# Patient Record
Sex: FEMALE | Race: WHITE | Employment: PART TIME | URBAN - METROPOLITAN AREA
[De-identification: names, ages, dates, MRNs, and addresses within clinical notes are randomized per-mention and may not be internally consistent; named-entity substitution may affect disease eponyms.]

---

## 2019-11-27 ENCOUNTER — MEDICAL CORRESPONDENCE (OUTPATIENT)
Dept: HEALTH INFORMATION MANAGEMENT | Facility: CLINIC | Age: 54
End: 2019-11-27

## 2019-11-27 LAB
ALT SERPL-CCNC: 17 U/L (ref 6–29)
AST SERPL-CCNC: 40 U/L (ref 10–35)
CREAT SERPL-MCNC: 0.59 MG/DL (ref 0.5–1.05)
GFR SERPL CREATININE-BSD FRML MDRD: 105 ML/MIN/1.73M2
GLUCOSE SERPL-MCNC: 91 MG/DL (ref 65–99)
POTASSIUM SERPL-SCNC: 3.8 MMOL/L (ref 3.5–5.3)

## 2019-12-26 ENCOUNTER — TRANSFERRED RECORDS (OUTPATIENT)
Dept: HEALTH INFORMATION MANAGEMENT | Facility: CLINIC | Age: 54
End: 2019-12-26

## 2019-12-26 LAB
ALT SERPL-CCNC: 19 U/L (ref 6–29)
AST SERPL-CCNC: 41 U/L (ref 10–35)
CREAT SERPL-MCNC: 0.51 MG/DL (ref 0.5–1.05)
GFR SERPL CREATININE-BSD FRML MDRD: 110 ML/MIN/1.73M2
GLUCOSE SERPL-MCNC: 104 MG/DL (ref 65–99)
POTASSIUM SERPL-SCNC: 3.4 MMOL/L (ref 3.5–5.3)

## 2020-01-07 ENCOUNTER — TRANSCRIBE ORDERS (OUTPATIENT)
Dept: OTHER | Age: 55
End: 2020-01-07

## 2020-01-08 ENCOUNTER — REFERRAL (OUTPATIENT)
Dept: TRANSPLANT | Facility: CLINIC | Age: 55
End: 2020-01-08

## 2020-01-08 DIAGNOSIS — K70.9 ALCOHOLIC LIVER DISEASE (H): Primary | ICD-10-CM

## 2020-01-08 DIAGNOSIS — K72.90: ICD-10-CM

## 2020-01-08 DIAGNOSIS — K70.31 ALCOHOLIC CIRRHOSIS OF LIVER WITH ASCITES (H): ICD-10-CM

## 2020-01-08 DIAGNOSIS — F10.20 ALCOHOL DEPENDENCE (H): ICD-10-CM

## 2020-01-08 DIAGNOSIS — K76.6 PORTAL HYPERTENSION (H): ICD-10-CM

## 2020-01-08 NOTE — LETTER
1/10/2020    Norma Gonzáles  [address]  [facility] [address]      Dear Norma,    Thank you for your interest in the Transplant Center at [facility], [facility]. We look forward to being a part of your care team and assisting you through the transplant process.    As we discussed, your transplant coordinator is Radu Martinez Jr. (Liver).  You may call your coordinator at any time with questions or concerns call [phone].    Please complete the following.    1. Fill out and return the enclosed forms    Authorization for Electronic Communication    Authorization to Discuss Protected Health Information    Authorization for Release of Protected Health Information    2. Sign up for:    Embedded Internet Solutionst, access to your electronic medical record (see enclosed pamphlet)    "[employer]"transplantRobot App Store.CrowdScannerr, a transplant education website    You can use these tools to learn more about your transplant, communicate with your care team, and track your medical details      Sincerely,  Solid Organ Transplant  [facility], [facility]

## 2020-01-08 NOTE — LETTER
LIVER CONSULT   SCHEDULE    Patient:   Norma Gonzáles  MR#:    [id number]  Coordinator:  Shyam          [phone]  :     Elissa             [phone]  Location[facility]    [facility]  Date(s):    February 4, 2020    This is your consult schedule, please follow dates and times.  You will   receive reminder phone calls for other tests, but please follow this schedule  only!  If you have any questions about dates and times, please call us on  number listed above.  Thank you, Transplant Services     *NO FOOD or DRINK AFTER 10:00 PM*  (You may only have small amounts of water)      Day/Date:    Tuesday, February 4, 2020  Time Location Activity   6:15 AM Imaging and Lab testing  (1st floor [facility] ) Liver Ultrasound with dopplers=NO FOOD or DRINK 8 HOURS BEFORE TEST!!!   7:00 AM Imaging and Lab testing  (1st floor [facility] ) Blood tests    8:00 AM Transplant Services   (3rd floor [facility]) Appointment with Dr. jones ,  Hepatologist   8:30 AM Transplant Services  (3rd floor [facility]) Appointment with Adele Dominguez  Registered Dietitian   9:00 AM Transplant Services  (3rd floor [facility]) Appointment with Maria A Arvizu         Day/Date:    Every Thursday *OPTIONAL*  Time Location Activity   12:00p.m. - 1:30p.m. Liver Transplant Support Group  Hospital Station [facility]  Room 7-120 Every Thursday *OPTIONAL*     *IF ON LINE CHECK IN IS NOT DONE, YOU WILL NEED TO CHECK IN 15 MINUTES EARLIER THEN THE FIRST SCHEDULED APPOINTMENT*

## 2020-01-08 NOTE — LETTER
January 10, 2020          MEDICAL RECORDS REQUEST  [facility] liver transplant team is requesting records from Referring Providers Office for patients referred to the Liver Transplant Program             Images Needed to Process Intake of Patient:    CXR Images (most recent)    Chest CT Images (all within last 24 months or most recent)    Abdominal CT Report and Images  (all within last 24 months or most recent)    Abdominal MRI Report and Images  (all within last 24 months or most recent)    Bone Scan Images and Report (No DEXA Scans)    PET Images and Report  Records Needed to Process Intake of Patient:    Cardiac Catheterization Results (most recent on file)    Chest CT Report (all within last 24 months or most recent)    Chest X-Ray Report (all within last 24 months or most recent)    Culture Results (last 2 years on file)    ECHO Results (most recent on file)    Hospital Discharge Summaries (last 2 years on file)    Lab Results (most recent on file)    History and Physical (original on file)    Liver Pathology All Reports     Physicians Notes (last 3 reports on file)    Radiology Reports (not including Chest X-ray, last 2 years on file)    EGD Images and Report     Colonoscopy Report with Pathology    Requested imaging may be electronically sent to the [employer] imaging system via [id number] DICOM connection.   When unable to send imaging electronically, an exported DICOM CD may be sent. Please indicate when images have been sent electronically on a return faxed cover sheet.    Requested pathology slides should be accompanied by the appropriate report from your institution.    When the patient is hand carrying requested records or the requested records are not at your facility, please indicate this information on a return faxed cover sheet.    Please fax all paper records to [phone] within 3-5 business days.    Please send all scans/slides to:   [facility]  Solid Organ  Transplant Office  [address] [facility], [place], [address] [address]    Please call our office at [phone] if you have any questions or concerns.

## 2020-01-10 VITALS — WEIGHT: 187 LBS | HEIGHT: 65 IN | BODY MASS INDEX: 31.16 KG/M2

## 2020-01-10 ASSESSMENT — MIFFLIN-ST. JEOR: SCORE: 1454.11

## 2020-01-10 NOTE — TELEPHONE ENCOUNTER
Patient Call: Voicemail  Date/Time: 1/10/20 12:08pm  Reason for call: Returning call for referral

## 2020-01-10 NOTE — TELEPHONE ENCOUNTER
Patient was asked the following questions during liver intake call.     Referring Provider: Dr. Berkley Martines   Referring Diagnosis: Acute on chronic alcoholic liver disease  PCP: Dr. Berkley Martines     1)Do you know why you have liver disease: Yes             If Alcoholic Cirrhosis is present when was your last drink: 10/2019             Have you ever been through treatment for alcohol: No  2) Presence of Ascites: Yes Paracentesis: No  3) Presence of Hepatic Encephalopathy: No Medications: No  4) History of GI Bleeding: No  5) EGD: No  6) Colonoscopy: No  7) MELD Score: 19  8) Insurance information: Medicaid [place]      Policy calvert: Self       Subscriber/policy/ID number: [id number]      Group Number:     Referral intake process completed.  Patient is aware that after financial approval is received, medical records will be requested.   Patient confirmed for a callback from transplant coordinator on 1/16/2020.  Tentative evaluation date TBD.    Confirmed coordinator will discuss evaluation process in more detail at the time of their call.   Patient is aware of the need to arrange age appropriate cancer screening, vaccinations, and dental care.  Reminded patient to complete questionnaire, complete medical records release, and review packet prior to evaluation visit .  Assessed patient for special needs (ie--wheelchair, assistance, guardian, and ):  None  Patient instructed to call [phone] with questions.     JAIME Bueno, LPN   Solid Organ Transplant

## 2020-01-10 NOTE — TELEPHONE ENCOUNTER
Patient left a message on the voicemail returning Juju's call.  She can be reached today at [phone]

## 2020-01-16 NOTE — TELEPHONE ENCOUNTER
Patient being referred by her PCP (see CE) Dr. Berkley Martines in [place], [place] for alcoholic cirrhosis. Patient was admitted for hematoma on 10/30/19 and dx with liver cirrhosis during admit. Her MELD during that say was 29. See CE for admission.     She recovered, has been sober since, and MELD has dropped to 15. She still battles some fluid, more edema related.     Plan: consult only on 2/4/20 at 8 am with Dr. Lucia. Consult over due to < 6 mo sobriety and improvement of MELD score. Labs before with SW & nutrition if possible the same trip.      Ascites - on lasix  Taps - none  HE - no  GIB - no  EGD - never  Colon - never    Head - none  Heart - none  Lungs - smoker (1/4-1/2 ppd)  Kidneys - kidney sx as youth (11 y/o) done at  - possible bladder   Cancer - none

## 2020-02-03 ENCOUNTER — TELEPHONE (OUTPATIENT)
Dept: GASTROENTEROLOGY | Facility: CLINIC | Age: 55
End: 2020-02-03

## 2020-02-04 ENCOUNTER — OFFICE VISIT (OUTPATIENT)
Dept: GASTROENTEROLOGY | Facility: CLINIC | Age: 55
End: 2020-02-04
Attending: INTERNAL MEDICINE
Payer: MEDICAID

## 2020-02-04 ENCOUNTER — ANCILLARY PROCEDURE (OUTPATIENT)
Dept: ULTRASOUND IMAGING | Facility: CLINIC | Age: 55
End: 2020-02-04
Attending: INTERNAL MEDICINE
Payer: MEDICAID

## 2020-02-04 ENCOUNTER — COMMITTEE REVIEW (OUTPATIENT)
Dept: TRANSPLANT | Facility: CLINIC | Age: 55
End: 2020-02-04

## 2020-02-04 ENCOUNTER — ALLIED HEALTH/NURSE VISIT (OUTPATIENT)
Dept: TRANSPLANT | Facility: CLINIC | Age: 55
End: 2020-02-04
Attending: INTERNAL MEDICINE
Payer: MEDICAID

## 2020-02-04 VITALS
TEMPERATURE: 97.8 F | HEART RATE: 76 BPM | OXYGEN SATURATION: 99 % | SYSTOLIC BLOOD PRESSURE: 158 MMHG | DIASTOLIC BLOOD PRESSURE: 75 MMHG

## 2020-02-04 DIAGNOSIS — F10.20 ALCOHOL DEPENDENCE (H): ICD-10-CM

## 2020-02-04 DIAGNOSIS — K70.31 ALCOHOLIC CIRRHOSIS OF LIVER WITH ASCITES (H): ICD-10-CM

## 2020-02-04 DIAGNOSIS — K70.9 ALCOHOLIC LIVER DISEASE (H): ICD-10-CM

## 2020-02-04 DIAGNOSIS — K76.6 PORTAL HYPERTENSION (H): ICD-10-CM

## 2020-02-04 DIAGNOSIS — K72.90: ICD-10-CM

## 2020-02-04 DIAGNOSIS — Z76.82 AWAITING ORGAN TRANSPLANT STATUS: Primary | ICD-10-CM

## 2020-02-04 DIAGNOSIS — F10.21 ALCOHOL DEPENDENCE IN REMISSION (H): ICD-10-CM

## 2020-02-04 DIAGNOSIS — K70.30 ALCOHOLIC CIRRHOSIS OF LIVER WITHOUT ASCITES (H): Primary | ICD-10-CM

## 2020-02-04 DIAGNOSIS — K70.9 ALCOHOLIC LIVER DISEASE (H): Primary | ICD-10-CM

## 2020-02-04 LAB
AFP SERPL-MCNC: 5.5 UG/L (ref 0–8)
ALBUMIN SERPL-MCNC: 3.2 G/DL (ref 3.4–5)
ALP SERPL-CCNC: 210 U/L (ref 40–150)
ALT SERPL W P-5'-P-CCNC: 30 U/L (ref 0–50)
ANION GAP SERPL CALCULATED.3IONS-SCNC: 6 MMOL/L (ref 3–14)
AST SERPL W P-5'-P-CCNC: 50 U/L (ref 0–45)
BILIRUB DIRECT SERPL-MCNC: 1.5 MG/DL (ref 0–0.2)
BILIRUB SERPL-MCNC: 2.8 MG/DL (ref 0.2–1.3)
BUN SERPL-MCNC: 17 MG/DL (ref 7–30)
CALCIUM SERPL-MCNC: 9.1 MG/DL (ref 8.5–10.1)
CHLORIDE SERPL-SCNC: 107 MMOL/L (ref 94–109)
CHOLEST SERPL-MCNC: 145 MG/DL
CO2 SERPL-SCNC: 28 MMOL/L (ref 20–32)
CREAT SERPL-MCNC: 0.59 MG/DL (ref 0.52–1.04)
DEPRECATED CALCIDIOL+CALCIFEROL SERPL-MC: 17 UG/L (ref 20–75)
GFR SERPL CREATININE-BSD FRML MDRD: >90 ML/MIN/{1.73_M2}
GLUCOSE SERPL-MCNC: 93 MG/DL (ref 70–99)
HBV CORE AB SERPL QL IA: NONREACTIVE
HBV SURFACE AB SERPL IA-ACNC: 0.32 M[IU]/ML
HBV SURFACE AG SERPL QL IA: NONREACTIVE
HCV AB SERPL QL IA: REACTIVE
HDLC SERPL-MCNC: 57 MG/DL
HIV 1+2 AB+HIV1 P24 AG SERPL QL IA: NONREACTIVE
INR PPP: 1.72 (ref 0.86–1.14)
IRON SATN MFR SERPL: 72 % (ref 15–46)
IRON SERPL-MCNC: 222 UG/DL (ref 35–180)
LDLC SERPL CALC-MCNC: 70 MG/DL
NONHDLC SERPL-MCNC: 88 MG/DL
POTASSIUM SERPL-SCNC: 3.5 MMOL/L (ref 3.4–5.3)
PROT SERPL-MCNC: 8.2 G/DL (ref 6.8–8.8)
SODIUM SERPL-SCNC: 140 MMOL/L (ref 133–144)
T PALLIDUM AB SER QL: NONREACTIVE
TIBC SERPL-MCNC: 307 UG/DL (ref 240–430)
TRANSFERRIN SERPL-MCNC: 249 MG/DL (ref 210–360)
TRIGL SERPL-MCNC: 89 MG/DL

## 2020-02-04 PROCEDURE — 36415 COLL VENOUS BLD VENIPUNCTURE: CPT | Performed by: INTERNAL MEDICINE

## 2020-02-04 PROCEDURE — 25000128 H RX IP 250 OP 636: Mod: ZF | Performed by: INTERNAL MEDICINE

## 2020-02-04 PROCEDURE — 85610 PROTHROMBIN TIME: CPT | Performed by: INTERNAL MEDICINE

## 2020-02-04 PROCEDURE — G0463 HOSPITAL OUTPT CLINIC VISIT: HCPCS | Mod: 25,ZF

## 2020-02-04 PROCEDURE — 83540 ASSAY OF IRON: CPT | Performed by: INTERNAL MEDICINE

## 2020-02-04 PROCEDURE — 86706 HEP B SURFACE ANTIBODY: CPT | Performed by: INTERNAL MEDICINE

## 2020-02-04 PROCEDURE — 80061 LIPID PANEL: CPT | Performed by: INTERNAL MEDICINE

## 2020-02-04 PROCEDURE — 82105 ALPHA-FETOPROTEIN SERUM: CPT | Performed by: INTERNAL MEDICINE

## 2020-02-04 PROCEDURE — 86780 TREPONEMA PALLIDUM: CPT | Performed by: INTERNAL MEDICINE

## 2020-02-04 PROCEDURE — 87340 HEPATITIS B SURFACE AG IA: CPT | Performed by: INTERNAL MEDICINE

## 2020-02-04 PROCEDURE — G0009 ADMIN PNEUMOCOCCAL VACCINE: HCPCS | Mod: ZF

## 2020-02-04 PROCEDURE — 83550 IRON BINDING TEST: CPT | Performed by: INTERNAL MEDICINE

## 2020-02-04 PROCEDURE — 86803 HEPATITIS C AB TEST: CPT | Performed by: INTERNAL MEDICINE

## 2020-02-04 PROCEDURE — 80076 HEPATIC FUNCTION PANEL: CPT | Performed by: INTERNAL MEDICINE

## 2020-02-04 PROCEDURE — 84466 ASSAY OF TRANSFERRIN: CPT | Performed by: INTERNAL MEDICINE

## 2020-02-04 PROCEDURE — 82306 VITAMIN D 25 HYDROXY: CPT | Performed by: INTERNAL MEDICINE

## 2020-02-04 PROCEDURE — 40000866 ZZHCL STATISTIC HIV 1/2 ANTIGEN/ANTIBODY PRETRANSPLANT ONLY: Performed by: INTERNAL MEDICINE

## 2020-02-04 PROCEDURE — 90732 PPSV23 VACC 2 YRS+ SUBQ/IM: CPT | Mod: ZF | Performed by: INTERNAL MEDICINE

## 2020-02-04 PROCEDURE — 97802 MEDICAL NUTRITION INDIV IN: CPT | Mod: ZF

## 2020-02-04 PROCEDURE — 80048 BASIC METABOLIC PNL TOTAL CA: CPT | Performed by: INTERNAL MEDICINE

## 2020-02-04 PROCEDURE — 86704 HEP B CORE ANTIBODY TOTAL: CPT | Performed by: INTERNAL MEDICINE

## 2020-02-04 RX ORDER — PNV,CALCIUM 72/IRON/FOLIC ACID 27 MG-1 MG
TABLET ORAL
COMMUNITY
Start: 2020-01-29

## 2020-02-04 RX ORDER — HYDROXYZINE HYDROCHLORIDE 25 MG/1
TABLET, FILM COATED ORAL
COMMUNITY
Start: 2020-01-29

## 2020-02-04 RX ORDER — LOSARTAN POTASSIUM 25 MG/1
50 TABLET ORAL
COMMUNITY
Start: 2020-01-29

## 2020-02-04 RX ORDER — LIDOCAINE 50 MG/G
1 PATCH TOPICAL
COMMUNITY
Start: 2019-12-05

## 2020-02-04 RX ORDER — GABAPENTIN 300 MG/1
CAPSULE ORAL
COMMUNITY
Start: 2019-12-26

## 2020-02-04 RX ORDER — LANOLIN ALCOHOL/MO/W.PET/CERES
400 CREAM (GRAM) TOPICAL
COMMUNITY
Start: 2020-01-29

## 2020-02-04 RX ORDER — POTASSIUM CHLORIDE 1.5 G/1.58G
POWDER, FOR SOLUTION ORAL
COMMUNITY
Start: 2020-01-08 | End: 2020-08-06 | Stop reason: ALTCHOICE

## 2020-02-04 RX ADMIN — PNEUMOCOCCAL VACCINE POLYVALENT 0.5 ML
25; 25; 25; 25; 25; 25; 25; 25; 25; 25; 25; 25; 25; 25; 25; 25; 25; 25; 25; 25; 25; 25; 25 INJECTION, SOLUTION INTRAMUSCULAR; SUBCUTANEOUS at 10:38

## 2020-02-04 ASSESSMENT — PAIN SCALES - GENERAL: PAINLEVEL: NO PAIN (0)

## 2020-02-04 NOTE — LETTER
February 6, 2020    Norma ROBLERO Nivia  [address]  [facility] [address]    Dear Ms. Gonzáles,   The purpose of this letter is to let you know that on 2/4/2020 the [facility] Multi-Disciplinary Selection Team reviewed the results of your transplant evaluation.  Based on the results of your evaluation and the selection criteria used by our program , the decision was made to not list you on the liver transplant list.  This is because your liver function has improved dramatically and you do not need a liver transplant at this time.   Important things you should know:    Please call your coordinator, Shyam Martinez RN, if you have any questions    Please continue to follow with Dr. Lucia who will re-refer you for evaluation if you liver function worsens or you become decompensated (start having liver related issues). Next appt is 8/6/2020    We recommend that you continue to follow up with your primary care doctor in order to manage your other health concerns.  Enclosed is a letter from [employer] which describes the services offered to patients by [facility] and the Organ Procurement and Transplantation Network.  Thank you for allowing us to participate in your care.  We wish you well.  Sincerely,    Radu Martinez Jr., BSN, RN  Liver Transplant Coordinator  [phone]    Solid Organ Transplant  [facility], [facility]    Enclosures: UNOS Letter  cc: Care Team            The Organ Procurement and Transplantation Network Toll-free patient services line: Your resource for organ transplant information    If you have a question regarding your own medical care, you always should call your transplant hospital first. However, for general organ transplant-related information, you can call the Organ Procurement and Transplantation Network (OPTN) toll-free patient services line at [phone]- [phone]. Anyone, including potential transplant candidates, candidates, recipients,  family members, friends, living donors, and donor family members, can call this number to:        Talk about organ donation, living donation, the transplant process, the donation process, and transplant policies.    Get a free patient information kit with helpful booklets, waiting list and transplant information, and a list of all transplant hospitals.    Ask questions about the OPTN website (https://optn.transplant.hrsa.gov/), the [employer] Network for Organ Sharing s (UNOS) website (https://unos.org/), or the UNOS website for living donors and transplant recipients. (https://www.transplantliving.org/).    Learn how the OPTN can help you.    Talk about any concerns that you may have with a transplant hospital.    The [facility] s transplant system, the OPTN, is managed under federal contract by the [employer] for Organ Sharing (UNOS), which is a non-profit charitable organization. The OPTN helps create and define organ sharing policies that make the best use of donated organs. This process continuously evaluating new advances and discoveries so policies can be adapted to best serve patients waiting for transplants. To do so, the OPTN works closely with transplant professionals, transplant patients, transplant candidates, donor families, living donors, and the public. All transplant programs and organ procurement organizations throughout the country are OPTN members and are obligated to follow the policies the OPTN creates for allocating organs.    The OPTN also is responsible for:      Providing educational material for patients, the public, and professionals.    Raising awareness of the need for donated organs and tissue.    Coordinating organ procurement, matching, and placement.    Collecting information about every organ transplant and donation that occurs in the [place].    Remember, you should contact your transplant hospital directly if you have questions or concerns about your own medical care including  medical records, work-up progress, and test results.    We are not your transplant hospital, and our staff will not be able to answer questions about your case, so please keep your transplant hospital s phone number handy.    However, while you research your transplant needs and learn as much as you can about transplantation and donation, we welcome your call to our toll-free patient services line at [phone].    Updated 4/1/2019

## 2020-02-04 NOTE — PROGRESS NOTES
Psychosocial Assessment-Liver Transplant Consultation  Norma ROBLERO Chinedus was seen in the Transplant Center as part of her consultation for liver transplant.  She will not be referred for a liver transplant evaluation, therefore her social work appointment was cancelled.  Please re-consult as needed.    Maria A Arvizu, LAUREENSW

## 2020-02-04 NOTE — PROGRESS NOTES
HISTORY OF PRESENT ILLNESS:  I had the pleasure of seeing Norma Gonzáles for consultation in the Liver Transplant Clinic at the [facility] on 02/04/2020.  Ms. Gonzáles presents for consultation regarding alcoholic cirrhosis.      She was first discovered to have alcoholic liver disease when she presented with a very large left hip hematoma.  She was hospitalized at [facility] in [place] and it was discovered that she had cirrhosis based on imaging.  She had been drinking up until the time of admission, drinking exclusively beer.  She does work as a .  She has stopped all alcohol since then, and the bilirubin which peaked in the 15s now has decreased dramatically.  She also did gain a great deal of weight during that hospitalization.  She did develop pneumonia as well as a C diff infection and was hospitalized there for about 3 weeks.  She believes she gained about 40 pounds in weight which she has largely now lost.      At present, she denies any right upper quadrant pain, itching or skin rash.  She does still have some fatigue.  She denies any increased abdominal girth and lower extremity edema has almost entirely resolved.  She has not had any gastrointestinal bleeding or any overt signs of hepatic encephalopathy.  She denies any fevers or chills, cough or shortness of breath.  She denies any nausea or vomiting, diarrhea or constipation.  Her appetite has been good.  She is interested in losing some weight.      She reports that when she stopped alcohol she did not develop any alcohol withdrawal and she has not had any other complications of alcoholism.      PAST MEDICAL HISTORY:  Fairly unremarkable.  Her only previous surgery was as a young child.  She had some sort of surgery on her urinary system.  Her kidney function has been normal since then so it is not altogether sure what that represented.  Her kidneys also appear normal.  She has no other medical problems.      SOCIAL  HISTORY:  Her alcohol is as noted above.  She is smoking about a half a pack per day and has been doing so for 35 years.  She has no risk factors for hepatitis C.  She is single.  She has no children.      FAMILY HISTORY:  Positive for liver disease that is not well clarified in her brother who is seeing Jayme Marmolejo at [facility].        REVIEW OF SYSTEMS:  A complete review of systems was negative other than that noted above.     Current Outpatient Medications   Medication     gabapentin (NEURONTIN) 300 MG capsule     hydrOXYzine (ATARAX) 25 MG tablet     lidocaine (LIDODERM) 5 % patch     losartan (COZAAR) 25 MG tablet     magnesium oxide (MAG-OX) 400 (240 Mg) MG tablet     potassium chloride (KLOR-CON) 20 MEQ packet     Prenatal Vit-Fe Fumarate-FA (PREPLUS) 27-1 MG TABS     No current facility-administered medications for this visit.      BP (!) 158/75 (BP Location: Right arm, Patient Position: Sitting, Cuff Size: Adult Regular)   Pulse 76   Temp 97.8  F (36.6  C)   SpO2 99%     PHYSICAL EXAMINATION:  In general, she actually looks relatively well.  HEENT exam shows no scleral icterus or temporal muscle wasting.  Her neck is without thyromegaly.  Chest is clear.  Cardiac exam reveals no S3, S4 or murmur.  Abdominal exam shows no increase in girth.  No masses or tenderness to palpation are present.  Her liver is 10 cm in span without left lobe enlargement.  No spleen tip is palpable, and extremity exam shows no edema.  Skin exam shows no stigmata of chronic liver disease.  Neurologic exam shows no asterixis.     Recent Results (from the past 168 hour(s))   Lipid Profile    Collection Time: 02/04/20  5:49 AM   Result Value Ref Range    Cholesterol 145 <200 mg/dL    Triglycerides 89 <150 mg/dL    HDL Cholesterol 57 >49 mg/dL    LDL Cholesterol Calculated 70 <100 mg/dL    Non HDL Cholesterol 88 <130 mg/dL   Transferrin    Collection Time: 02/04/20  5:49 AM   Result Value Ref Range    Transferrin 249 210 - 360 mg/dL    Iron and iron binding capacity    Collection Time: 02/04/20  5:49 AM   Result Value Ref Range    Iron 222 (H) 35 - 180 ug/dL    Iron Binding Cap 307 240 - 430 ug/dL    Iron Saturation Index 72 (H) 15 - 46 %   INR    Collection Time: 02/04/20  5:49 AM   Result Value Ref Range    INR 1.72 (H) 0.86 - 1.14   AFP tumor marker    Collection Time: 02/04/20  5:49 AM   Result Value Ref Range    Alpha Fetoprotein 5.5 0 - 8 ug/L   Hepatic panel    Collection Time: 02/04/20  5:49 AM   Result Value Ref Range    Bilirubin Direct 1.5 (H) 0.0 - 0.2 mg/dL    Bilirubin Total 2.8 (H) 0.2 - 1.3 mg/dL    Albumin 3.2 (L) 3.4 - 5.0 g/dL    Protein Total 8.2 6.8 - 8.8 g/dL    Alkaline Phosphatase 210 (H) 40 - 150 U/L    ALT 30 0 - 50 U/L    AST 50 (H) 0 - 45 U/L   Basic metabolic panel    Collection Time: 02/04/20  5:49 AM   Result Value Ref Range    Sodium 140 133 - 144 mmol/L    Potassium 3.5 3.4 - 5.3 mmol/L    Chloride 107 94 - 109 mmol/L    Carbon Dioxide 28 20 - 32 mmol/L    Anion Gap 6 3 - 14 mmol/L    Glucose 93 70 - 99 mg/dL    Urea Nitrogen 17 7 - 30 mg/dL    Creatinine 0.59 0.52 - 1.04 mg/dL    GFR Estimate >90 >60 mL/min/[1.73_m2]    GFR Estimate If Black >90 >60 mL/min/[1.73_m2]    Calcium 9.1 8.5 - 10.1 mg/dL      IMAGING:  She did have an ultrasound in clinic that shows a cirrhotic-appearing liver.  No focal masses are seen.  There is no ascites or pleural effusion.      IMPRESSION:  Ms. Gonzáles has alcoholic cirrhosis.  It seems as though she was really quite ill back in November but now her liver has improved dramatically.  Her current MELD score is only 12.  Thus, I do not think she is a candidate at this point in time for transplantation based on improved condition of her liver.  I strongly emphasized that she needs to completely abstain from alcohol for the near future.      In terms of screening and vaccinations, she will get Pneumovax today and we will give her Prevnar 13 in 1 year.  I recommend she get a flu shot as  well.  She does need an upper GI endoscopy to screen for esophageal varices.  She also should have a bone density to screen for osteoporosis.  All that can be done at her local clinic or in [place].  My plan will be to see her back in the clinic in 6 months.      Thank you very much for allowing me to participate in the care of this patient.  If you have any questions regarding my recommendations, please do not hesitate to contact me.       Magdy Lucia MD      Professor of Medicine  [employer] [employer]      Executive Medical Director, Solid Organ Transplant Program  [facility]

## 2020-02-04 NOTE — LETTER
2/4/2020      RE: Norma Gonzáles  [address]  \[facility] [place] [facility]\"" [id number]       HISTORY OF PRESENT ILLNESS:  I had the pleasure of seeing Norma Gonzáles for consultation in the Liver Transplant Clinic at the [facility] on 02/04/2020.  Ms. Gonzáles presents for consultation regarding alcoholic cirrhosis.      She was first discovered to have alcoholic liver disease when she presented with a very large left hip hematoma.  She was hospitalized at [facility] in [place] and it was discovered that she had cirrhosis based on imaging.  She had been drinking up until the time of admission, drinking exclusively beer.  She does work as a .  She has stopped all alcohol since then, and the bilirubin which peaked in the 15s now has decreased dramatically.  She also did gain a great deal of weight during that hospitalization.  She did develop pneumonia as well as a C diff infection and was hospitalized there for about 3 weeks.  She believes she gained about 40 pounds in weight which she has largely now lost.      At present, she denies any right upper quadrant pain, itching or skin rash.  She does still have some fatigue.  She denies any increased abdominal girth and lower extremity edema has almost entirely resolved.  She has not had any gastrointestinal bleeding or any overt signs of hepatic encephalopathy.  She denies any fevers or chills, cough or shortness of breath.  She denies any nausea or vomiting, diarrhea or constipation.  Her appetite has been good.  She is interested in losing some weight.      She reports that when she stopped alcohol she did not develop any alcohol withdrawal and she has not had any other complications of alcoholism.      PAST MEDICAL HISTORY:  Fairly unremarkable.  Her only previous surgery was as a young child.  She had some sort of surgery on her urinary system.  Her kidney function has been normal since then so it is not altogether sure what that represented.  Her kidneys also  appear normal.  She has no other medical problems.      SOCIAL HISTORY:  Her alcohol is as noted above.  She is smoking about a half a pack per day and has been doing so for 35 years.  She has no risk factors for hepatitis C.  She is single.  She has no children.      FAMILY HISTORY:  Positive for liver disease that is not well clarified in her brother who is seeing Jayme Marmolejo at [facility].        REVIEW OF SYSTEMS:  A complete review of systems was negative other than that noted above.     Current Outpatient Medications   Medication     gabapentin (NEURONTIN) 300 MG capsule     hydrOXYzine (ATARAX) 25 MG tablet     lidocaine (LIDODERM) 5 % patch     losartan (COZAAR) 25 MG tablet     magnesium oxide (MAG-OX) 400 (240 Mg) MG tablet     potassium chloride (KLOR-CON) 20 MEQ packet     Prenatal Vit-Fe Fumarate-FA (PREPLUS) 27-1 MG TABS     No current facility-administered medications for this visit.      BP (!) 158/75 (BP Location: Right arm, Patient Position: Sitting, Cuff Size: Adult Regular)   Pulse 76   Temp 97.8  F (36.6  C)   SpO2 99%     PHYSICAL EXAMINATION:  In general, she actually looks relatively well.  HEENT exam shows no scleral icterus or temporal muscle wasting.  Her neck is without thyromegaly.  Chest is clear.  Cardiac exam reveals no S3, S4 or murmur.  Abdominal exam shows no increase in girth.  No masses or tenderness to palpation are present.  Her liver is 10 cm in span without left lobe enlargement.  No spleen tip is palpable, and extremity exam shows no edema.  Skin exam shows no stigmata of chronic liver disease.  Neurologic exam shows no asterixis.     Recent Results (from the past 168 hour(s))   Lipid Profile    Collection Time: 02/04/20  5:49 AM   Result Value Ref Range    Cholesterol 145 <200 mg/dL    Triglycerides 89 <150 mg/dL    HDL Cholesterol 57 >49 mg/dL    LDL Cholesterol Calculated 70 <100 mg/dL    Non HDL Cholesterol 88 <130 mg/dL   Transferrin    Collection Time: 02/04/20  5:49  AM   Result Value Ref Range    Transferrin 249 210 - 360 mg/dL   Iron and iron binding capacity    Collection Time: 02/04/20  5:49 AM   Result Value Ref Range    Iron 222 (H) 35 - 180 ug/dL    Iron Binding Cap 307 240 - 430 ug/dL    Iron Saturation Index 72 (H) 15 - 46 %   INR    Collection Time: 02/04/20  5:49 AM   Result Value Ref Range    INR 1.72 (H) 0.86 - 1.14   AFP tumor marker    Collection Time: 02/04/20  5:49 AM   Result Value Ref Range    Alpha Fetoprotein 5.5 0 - 8 ug/L   Hepatic panel    Collection Time: 02/04/20  5:49 AM   Result Value Ref Range    Bilirubin Direct 1.5 (H) 0.0 - 0.2 mg/dL    Bilirubin Total 2.8 (H) 0.2 - 1.3 mg/dL    Albumin 3.2 (L) 3.4 - 5.0 g/dL    Protein Total 8.2 6.8 - 8.8 g/dL    Alkaline Phosphatase 210 (H) 40 - 150 U/L    ALT 30 0 - 50 U/L    AST 50 (H) 0 - 45 U/L   Basic metabolic panel    Collection Time: 02/04/20  5:49 AM   Result Value Ref Range    Sodium 140 133 - 144 mmol/L    Potassium 3.5 3.4 - 5.3 mmol/L    Chloride 107 94 - 109 mmol/L    Carbon Dioxide 28 20 - 32 mmol/L    Anion Gap 6 3 - 14 mmol/L    Glucose 93 70 - 99 mg/dL    Urea Nitrogen 17 7 - 30 mg/dL    Creatinine 0.59 0.52 - 1.04 mg/dL    GFR Estimate >90 >60 mL/min/[1.73_m2]    GFR Estimate If Black >90 >60 mL/min/[1.73_m2]    Calcium 9.1 8.5 - 10.1 mg/dL      IMAGING:  She did have an ultrasound in clinic that shows a cirrhotic-appearing liver.  No focal masses are seen.  There is no ascites or pleural effusion.      IMPRESSION:  Ms. Gonzáles has alcoholic cirrhosis.  It seems as though she was really quite ill back in November but now her liver has improved dramatically.  Her current MELD score is only 12.  Thus, I do not think she is a candidate at this point in time for transplantation based on improved condition of her liver.  I strongly emphasized that she needs to completely abstain from alcohol for the near future.      In terms of screening and vaccinations, she will get Pneumovax today and we will  give her Prevnar 13 in 1 year.  I recommend she get a flu shot as well.  She does need an upper GI endoscopy to screen for esophageal varices.  She also should have a bone density to screen for osteoporosis.  All that can be done at her local clinic or in [place].  My plan will be to see her back in the clinic in 6 months.      Thank you very much for allowing me to participate in the care of this patient.  If you have any questions regarding my recommendations, please do not hesitate to contact me.       Magdy Lucia MD      Professor of Medicine  [employer]      Executive Medical Director, Solid Organ Transplant Program  [facility]     Magdy Lucia MD

## 2020-02-04 NOTE — NURSING NOTE
Chief Complaint   Patient presents with     New Patient     Liver Consult     BP (!) 158/75 (BP Location: Right arm, Patient Position: Sitting, Cuff Size: Adult Regular)   Pulse 76   Temp 97.8  F (36.6  C)   SpO2 99%       Mushtaq Barney, EMT

## 2020-02-04 NOTE — LETTER
2/4/2020       RE: Norma Gonzáles  [address]  [facility] [address]     Dear Colleague,    Thank you for referring your patient, Norma Gonzáles, to the [facility] HEPATOLOGY at [facility]. Please see a copy of my visit note below.    HISTORY OF PRESENT ILLNESS:  I had the pleasure of seeing Norma Gonzáles for consultation in the Liver Transplant Clinic at the [facility] on 02/04/2020.  Ms. Gonzáles presents for consultation regarding alcoholic cirrhosis.      She was first discovered to have alcoholic liver disease when she presented with a very large left hip hematoma.  She was hospitalized at [facility] in [place] and it was discovered that she had cirrhosis based on imaging.  She had been drinking up until the time of admission, drinking exclusively beer.  She does work as a .  She has stopped all alcohol since then, and the bilirubin which peaked in the 15s now has decreased dramatically.  She also did gain a great deal of weight during that hospitalization.  She did develop pneumonia as well as a C diff infection and was hospitalized there for about 3 weeks.  She believes she gained about 40 pounds in weight which she has largely now lost.      At present, she denies any right upper quadrant pain, itching or skin rash.  She does still have some fatigue.  She denies any increased abdominal girth and lower extremity edema has almost entirely resolved.  She has not had any gastrointestinal bleeding or any overt signs of hepatic encephalopathy.  She denies any fevers or chills, cough or shortness of breath.  She denies any nausea or vomiting, diarrhea or constipation.  Her appetite has been good.  She is interested in losing some weight.      She reports that when she stopped alcohol she did not develop any alcohol withdrawal and she has not had any other complications of alcoholism.      PAST MEDICAL HISTORY:  Fairly unremarkable.  Her only previous  surgery was as a young child.  She had some sort of surgery on her urinary system.  Her kidney function has been normal since then so it is not altogether sure what that represented.  Her kidneys also appear normal.  She has no other medical problems.      SOCIAL HISTORY:  Her alcohol is as noted above.  She is smoking about a half a pack per day and has been doing so for 35 years.  She has no risk factors for hepatitis C.  She is single.  She has no children.      FAMILY HISTORY:  Positive for liver disease that is not well clarified in her brother who is seeing Jayme Marmolejo at [facility].        REVIEW OF SYSTEMS:  A complete review of systems was negative other than that noted above.     Current Outpatient Medications   Medication     gabapentin (NEURONTIN) 300 MG capsule     hydrOXYzine (ATARAX) 25 MG tablet     lidocaine (LIDODERM) 5 % patch     losartan (COZAAR) 25 MG tablet     magnesium oxide (MAG-OX) 400 (240 Mg) MG tablet     potassium chloride (KLOR-CON) 20 MEQ packet     Prenatal Vit-Fe Fumarate-FA (PREPLUS) 27-1 MG TABS     No current facility-administered medications for this visit.      BP (!) 158/75 (BP Location: Right arm, Patient Position: Sitting, Cuff Size: Adult Regular)   Pulse 76   Temp 97.8  F (36.6  C)   SpO2 99%     PHYSICAL EXAMINATION:  In general, she actually looks relatively well.  HEENT exam shows no scleral icterus or temporal muscle wasting.  Her neck is without thyromegaly.  Chest is clear.  Cardiac exam reveals no S3, S4 or murmur.  Abdominal exam shows no increase in girth.  No masses or tenderness to palpation are present.  Her liver is 10 cm in span without left lobe enlargement.  No spleen tip is palpable, and extremity exam shows no edema.  Skin exam shows no stigmata of chronic liver disease.  Neurologic exam shows no asterixis.     Recent Results (from the past 168 hour(s))   Lipid Profile    Collection Time: 02/04/20  5:49 AM   Result Value Ref Range    Cholesterol 145  <200 mg/dL    Triglycerides 89 <150 mg/dL    HDL Cholesterol 57 >49 mg/dL    LDL Cholesterol Calculated 70 <100 mg/dL    Non HDL Cholesterol 88 <130 mg/dL   Transferrin    Collection Time: 02/04/20  5:49 AM   Result Value Ref Range    Transferrin 249 210 - 360 mg/dL   Iron and iron binding capacity    Collection Time: 02/04/20  5:49 AM   Result Value Ref Range    Iron 222 (H) 35 - 180 ug/dL    Iron Binding Cap 307 240 - 430 ug/dL    Iron Saturation Index 72 (H) 15 - 46 %   INR    Collection Time: 02/04/20  5:49 AM   Result Value Ref Range    INR 1.72 (H) 0.86 - 1.14   AFP tumor marker    Collection Time: 02/04/20  5:49 AM   Result Value Ref Range    Alpha Fetoprotein 5.5 0 - 8 ug/L   Hepatic panel    Collection Time: 02/04/20  5:49 AM   Result Value Ref Range    Bilirubin Direct 1.5 (H) 0.0 - 0.2 mg/dL    Bilirubin Total 2.8 (H) 0.2 - 1.3 mg/dL    Albumin 3.2 (L) 3.4 - 5.0 g/dL    Protein Total 8.2 6.8 - 8.8 g/dL    Alkaline Phosphatase 210 (H) 40 - 150 U/L    ALT 30 0 - 50 U/L    AST 50 (H) 0 - 45 U/L   Basic metabolic panel    Collection Time: 02/04/20  5:49 AM   Result Value Ref Range    Sodium 140 133 - 144 mmol/L    Potassium 3.5 3.4 - 5.3 mmol/L    Chloride 107 94 - 109 mmol/L    Carbon Dioxide 28 20 - 32 mmol/L    Anion Gap 6 3 - 14 mmol/L    Glucose 93 70 - 99 mg/dL    Urea Nitrogen 17 7 - 30 mg/dL    Creatinine 0.59 0.52 - 1.04 mg/dL    GFR Estimate >90 >60 mL/min/[1.73_m2]    GFR Estimate If Black >90 >60 mL/min/[1.73_m2]    Calcium 9.1 8.5 - 10.1 mg/dL      IMAGING:  She did have an ultrasound in clinic that shows a cirrhotic-appearing liver.  No focal masses are seen.  There is no ascites or pleural effusion.      IMPRESSION:  Ms. Gonzáles has alcoholic cirrhosis.  It seems as though she was really quite ill back in November but now her liver has improved dramatically.  Her current MELD score is only 12.  Thus, I do not think she is a candidate at this point in time for transplantation based on improved  condition of her liver.  I strongly emphasized that she needs to completely abstain from alcohol for the near future.      In terms of screening and vaccinations, she will get Pneumovax today and we will give her Prevnar 13 in 1 year.  I recommend she get a flu shot as well.  She does need an upper GI endoscopy to screen for esophageal varices.  She also should have a bone density to screen for osteoporosis.  All that can be done at her local clinic or in [place].  My plan will be to see her back in the clinic in 6 months.      Thank you very much for allowing me to participate in the care of this patient.  If you have any questions regarding my recommendations, please do not hesitate to contact me.       Magdy Lucia MD      Professor of Medicine  [employer]      Executive Medical Director, Solid Organ Transplant Program  [facility]

## 2020-02-04 NOTE — LETTER
2/4/2020       RE: Norma Gonzáles  [address]  [facility] [address]     Dear Colleague,    Thank you for referring your patient, Norma Gonzáles, to the [facility] HEPATOLOGY at [facility]. Please see a copy of my visit note below.    HISTORY OF PRESENT ILLNESS:  I had the pleasure of seeing Norma Gonzáles for consultation in the Liver Transplant Clinic at the [facility] on 02/04/2020.  Ms. Gonzáles presents for consultation regarding alcoholic cirrhosis.      She was first discovered to have alcoholic liver disease when she presented with a very large left hip hematoma.  She was hospitalized at [facility] in [place] and it was discovered that she had cirrhosis based on imaging.  She had been drinking up until the time of admission, drinking exclusively beer.  She does work as a .  She has stopped all alcohol since then, and the bilirubin which peaked in the 15s now has decreased dramatically.  She also did gain a great deal of weight during that hospitalization.  She did develop pneumonia as well as a C diff infection and was hospitalized there for about 3 weeks.  She believes she gained about 40 pounds in weight which she has largely now lost.      At present, she denies any right upper quadrant pain, itching or skin rash.  She does still have some fatigue.  She denies any increased abdominal girth and lower extremity edema has almost entirely resolved.  She has not had any gastrointestinal bleeding or any overt signs of hepatic encephalopathy.  She denies any fevers or chills, cough or shortness of breath.  She denies any nausea or vomiting, diarrhea or constipation.  Her appetite has been good.  She is interested in losing some weight.      She reports that when she stopped alcohol she did not develop any alcohol withdrawal and she has not had any other complications of alcoholism.      PAST MEDICAL HISTORY:  Fairly unremarkable.  Her only previous  surgery was as a young child.  She had some sort of surgery on her urinary system.  Her kidney function has been normal since then so it is not altogether sure what that represented.  Her kidneys also appear normal.  She has no other medical problems.      SOCIAL HISTORY:  Her alcohol is as noted above.  She is smoking about a half a pack per day and has been doing so for 35 years.  She has no risk factors for hepatitis C.  She is single.  She has no children.      FAMILY HISTORY:  Positive for liver disease that is not well clarified in her brother who is seeing Jayme Marmolejo at [facility].        REVIEW OF SYSTEMS:  A complete review of systems was negative other than that noted above.     Current Outpatient Medications   Medication     gabapentin (NEURONTIN) 300 MG capsule     hydrOXYzine (ATARAX) 25 MG tablet     lidocaine (LIDODERM) 5 % patch     losartan (COZAAR) 25 MG tablet     magnesium oxide (MAG-OX) 400 (240 Mg) MG tablet     potassium chloride (KLOR-CON) 20 MEQ packet     Prenatal Vit-Fe Fumarate-FA (PREPLUS) 27-1 MG TABS     No current facility-administered medications for this visit.      BP (!) 158/75 (BP Location: Right arm, Patient Position: Sitting, Cuff Size: Adult Regular)   Pulse 76   Temp 97.8  F (36.6  C)   SpO2 99%     PHYSICAL EXAMINATION:  In general, she actually looks relatively well.  HEENT exam shows no scleral icterus or temporal muscle wasting.  Her neck is without thyromegaly.  Chest is clear.  Cardiac exam reveals no S3, S4 or murmur.  Abdominal exam shows no increase in girth.  No masses or tenderness to palpation are present.  Her liver is 10 cm in span without left lobe enlargement.  No spleen tip is palpable, and extremity exam shows no edema.  Skin exam shows no stigmata of chronic liver disease.  Neurologic exam shows no asterixis.     Recent Results (from the past 168 hour(s))   Lipid Profile    Collection Time: 02/04/20  5:49 AM   Result Value Ref Range    Cholesterol 145  <200 mg/dL    Triglycerides 89 <150 mg/dL    HDL Cholesterol 57 >49 mg/dL    LDL Cholesterol Calculated 70 <100 mg/dL    Non HDL Cholesterol 88 <130 mg/dL   Transferrin    Collection Time: 02/04/20  5:49 AM   Result Value Ref Range    Transferrin 249 210 - 360 mg/dL   Iron and iron binding capacity    Collection Time: 02/04/20  5:49 AM   Result Value Ref Range    Iron 222 (H) 35 - 180 ug/dL    Iron Binding Cap 307 240 - 430 ug/dL    Iron Saturation Index 72 (H) 15 - 46 %   INR    Collection Time: 02/04/20  5:49 AM   Result Value Ref Range    INR 1.72 (H) 0.86 - 1.14   AFP tumor marker    Collection Time: 02/04/20  5:49 AM   Result Value Ref Range    Alpha Fetoprotein 5.5 0 - 8 ug/L   Hepatic panel    Collection Time: 02/04/20  5:49 AM   Result Value Ref Range    Bilirubin Direct 1.5 (H) 0.0 - 0.2 mg/dL    Bilirubin Total 2.8 (H) 0.2 - 1.3 mg/dL    Albumin 3.2 (L) 3.4 - 5.0 g/dL    Protein Total 8.2 6.8 - 8.8 g/dL    Alkaline Phosphatase 210 (H) 40 - 150 U/L    ALT 30 0 - 50 U/L    AST 50 (H) 0 - 45 U/L   Basic metabolic panel    Collection Time: 02/04/20  5:49 AM   Result Value Ref Range    Sodium 140 133 - 144 mmol/L    Potassium 3.5 3.4 - 5.3 mmol/L    Chloride 107 94 - 109 mmol/L    Carbon Dioxide 28 20 - 32 mmol/L    Anion Gap 6 3 - 14 mmol/L    Glucose 93 70 - 99 mg/dL    Urea Nitrogen 17 7 - 30 mg/dL    Creatinine 0.59 0.52 - 1.04 mg/dL    GFR Estimate >90 >60 mL/min/[1.73_m2]    GFR Estimate If Black >90 >60 mL/min/[1.73_m2]    Calcium 9.1 8.5 - 10.1 mg/dL      IMAGING:  She did have an ultrasound in clinic that shows a cirrhotic-appearing liver.  No focal masses are seen.  There is no ascites or pleural effusion.      IMPRESSION:  Ms. Gonzáles has alcoholic cirrhosis.  It seems as though she was really quite ill back in November but now her liver has improved dramatically.  Her current MELD score is only 12.  Thus, I do not think she is a candidate at this point in time for transplantation based on improved  condition of her liver.  I strongly emphasized that she needs to completely abstain from alcohol for the near future.      In terms of screening and vaccinations, she will get Pneumovax today and we will give her Prevnar 13 in 1 year.  I recommend she get a flu shot as well.  She does need an upper GI endoscopy to screen for esophageal varices.  She also should have a bone density to screen for osteoporosis.  All that can be done at her local clinic or in [place].  My plan will be to see her back in the clinic in 6 months.      Thank you very much for allowing me to participate in the care of this patient.  If you have any questions regarding my recommendations, please do not hesitate to contact me.       Magdy Lucia MD      Professor of Medicine  [employer]      Executive Medical Director, Solid Organ Transplant Program  [facility]

## 2020-02-04 NOTE — COMMITTEE REVIEW
Abdominal Committee Review Note     Evaluation Date: 2/4/2020  Committee Review Date: 2/4/2020    Organ being evaluated for: Liver    Transplant Phase: Evaluation  Transplant Status: Active    Transplant Coordinator: Radu Martinez Jr.  Transplant Surgeon:       Referring Physician: Provider Not In System    Primary Diagnosis:   Secondary Diagnosis:     Committee Review Members:  Nutrition Soo Geronimo, RD   Pharmacy Hardy Navarrete, [facility]    - Clinical Maria A Arvizu, EFRAIN, Jessica Lima, [facility]   Transplant Magdy Lucia MD, Jr Radu Martinez RN, Elissa Lora MD, Leanne Fuchs, APRN CNP, James Dean MD, James Singh MD, Darby Hawkins, HAKAN, Jesus Bazan MD, Segundo Glass MD, Thomas M. Leventhal, MD, Justin Kennedy MD, Alda Pablo MD       Transplant Eligibility: Cirrhosis with MELD, ETOH    Committee Review Decision: Declined    Relative Contraindications: Other, too well - does not need transplant at this time    Absolute Contraindications: None    Committee Chair Elissa Lora MD verbally attested to the committee's decision.    Committee Discussion Details: close referral/evaluation    Doing too well for transplant. Continue to follow and re-refer if decompensation or MELD increases.

## 2020-02-04 NOTE — PROGRESS NOTES
"Outpatient MNT: Liver Consult    Current BMI: 31 kg/m2 (HT 65 in,  lbs/85 kg)     Time Spent: 30 minutes  Visit Type: Initial   Referring Physician: Dr. Lucia  Pt accompanied by: Brother     Medical dx associated with RD referral  Alcoholic liver disease    Nutrition Assessment  Appetite: Pt reports having a good appetite but does not have an eating schedule. 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      Vitamins, Supplements, Pertinent Meds: B12, Prenatal   Herbal Medicines/Supplements: None    Diet Recall  Unscheduled meals throughout the day  Breakfast burrito, spinach salad with berries, hamburgers, cheese, red meat like steak, donuts, ice cream    Beverages Pop (3 cans per week), 5-6 x 16 fl oz bottles of water   Dining out 1-3 meals per month      Physical Activity  No designated exercise time      Anthropometrics  Height:   65 in   BMI:    31 kg/m2    Weight Status:Obesity Grade I BMI 30-34.9   Weight:  187 lbs (85 kg)             IBW (lb): 125#  % IBW: 150%    Wt Hx: The pt reports that when she was drinking last year she \"got down to a size three\" and thinks she has gained 60# over the last 6 months. This wt gained in partially d/t fluid retention.     Adj/dosing BW: 141 lbs/64 kg       Malnutrition  % Intake: No decreased intake noted  % Weight Loss: None noted  Subcutaneous Fat Loss: None noted  Muscle Loss: None noted  Fluid Accumulation/Edema: mild generalized edema   Malnutrition Diagnosis: Patient does not meet two of the above criteria necessary for diagnosing malnutrition    Estimated Nutrition Needs  Energy  7393-2152 kcal/day      (25-30 kcal/kg for maintenance)   Protein  51-64 g/day    (0.8-1 g/kg for maintenance)        Fluid  1 ml/kcal or per MD        Micronutrient   Na+: <2000 mg/day            Nutrition Diagnosis  Excessive Na+ intake r/t food and nutrition related knowledge deficit AEB diet recall reveals " high Na+ foods.    Nutrition Intervention  Nutrition education provided:  Discussed sodium intake (low sodium foods and drinks, seasoning food without salt and tips for low sodium diet).    Discussed the importance of consuming adequate protein. Helped the pt identify several low sodium protein sources that she already enjoys eating and encouraged her to eat at least 3 servings per day. Encouraged receiving protein from both animal and plant based sources.     Discussed strategies for wt loss including: increased physical activity (set exercise goal of walking for 20 minutes, 3 days per week), eating 3 schedules meals to day to prevent starvation after work, building meals around protein, adding veggie sides second and starches last if she is still hungry, etc.     Patient Understanding: Pt verbalized understanding of education provided.  Expected Compliance: Good  Follow-Up Plans: PRN     Nutrition Goals  1. Limit Na+ <2000mg/day  2. Pt to verbalize understanding of 3 aspects of nutrition education provided    Soo Geronimo RD, LD  [facility] [phone]

## 2020-03-22 ENCOUNTER — HEALTH MAINTENANCE LETTER (OUTPATIENT)
Age: 55
End: 2020-03-22

## 2020-06-08 ENCOUNTER — DOCUMENTATION ONLY (OUTPATIENT)
Dept: CARE COORDINATION | Facility: CLINIC | Age: 55
End: 2020-06-08

## 2020-06-09 ENCOUNTER — DOCUMENTATION ONLY (OUTPATIENT)
Dept: CARE COORDINATION | Facility: CLINIC | Age: 55
End: 2020-06-09

## 2020-08-06 ENCOUNTER — OFFICE VISIT (OUTPATIENT)
Dept: GASTROENTEROLOGY | Facility: CLINIC | Age: 55
End: 2020-08-06
Attending: INTERNAL MEDICINE
Payer: COMMERCIAL

## 2020-08-06 ENCOUNTER — ANCILLARY PROCEDURE (OUTPATIENT)
Dept: ULTRASOUND IMAGING | Facility: CLINIC | Age: 55
End: 2020-08-06
Attending: INTERNAL MEDICINE
Payer: MEDICAID

## 2020-08-06 ENCOUNTER — TELEPHONE (OUTPATIENT)
Dept: GASTROENTEROLOGY | Facility: CLINIC | Age: 55
End: 2020-08-06

## 2020-08-06 VITALS
DIASTOLIC BLOOD PRESSURE: 78 MMHG | HEART RATE: 63 BPM | OXYGEN SATURATION: 99 % | TEMPERATURE: 98 F | BODY MASS INDEX: 31.82 KG/M2 | SYSTOLIC BLOOD PRESSURE: 166 MMHG | WEIGHT: 191.2 LBS

## 2020-08-06 DIAGNOSIS — K70.30 ALCOHOLIC CIRRHOSIS OF LIVER WITHOUT ASCITES (H): ICD-10-CM

## 2020-08-06 DIAGNOSIS — K70.30 ALCOHOLIC CIRRHOSIS OF LIVER WITHOUT ASCITES (H): Primary | ICD-10-CM

## 2020-08-06 LAB
AFP SERPL-MCNC: 7.1 UG/L (ref 0–8)
ALBUMIN SERPL-MCNC: 3.2 G/DL (ref 3.4–5)
ALP SERPL-CCNC: 150 U/L (ref 40–150)
ALT SERPL W P-5'-P-CCNC: 31 U/L (ref 0–50)
ANION GAP SERPL CALCULATED.3IONS-SCNC: 7 MMOL/L (ref 3–14)
AST SERPL W P-5'-P-CCNC: 41 U/L (ref 0–45)
BILIRUB DIRECT SERPL-MCNC: 1 MG/DL (ref 0–0.2)
BILIRUB SERPL-MCNC: 2.6 MG/DL (ref 0.2–1.3)
BUN SERPL-MCNC: 10 MG/DL (ref 7–30)
CALCIUM SERPL-MCNC: 8.8 MG/DL (ref 8.5–10.1)
CHLORIDE SERPL-SCNC: 113 MMOL/L (ref 94–109)
CO2 SERPL-SCNC: 23 MMOL/L (ref 20–32)
CREAT SERPL-MCNC: 0.62 MG/DL (ref 0.52–1.04)
ERYTHROCYTE [DISTWIDTH] IN BLOOD BY AUTOMATED COUNT: 19.1 % (ref 10–15)
GFR SERPL CREATININE-BSD FRML MDRD: >90 ML/MIN/{1.73_M2}
GLUCOSE SERPL-MCNC: 95 MG/DL (ref 70–99)
HCT VFR BLD AUTO: 34 % (ref 35–47)
HGB BLD-MCNC: 11.6 G/DL (ref 11.7–15.7)
INR PPP: 1.75 (ref 0.86–1.14)
MCH RBC QN AUTO: 33.2 PG (ref 26.5–33)
MCHC RBC AUTO-ENTMCNC: 34.1 G/DL (ref 31.5–36.5)
MCV RBC AUTO: 97 FL (ref 78–100)
PLATELET # BLD AUTO: 49 10E9/L (ref 150–450)
POTASSIUM SERPL-SCNC: 3.5 MMOL/L (ref 3.4–5.3)
PROT SERPL-MCNC: 7.2 G/DL (ref 6.8–8.8)
RBC # BLD AUTO: 3.49 10E12/L (ref 3.8–5.2)
SODIUM SERPL-SCNC: 144 MMOL/L (ref 133–144)
WBC # BLD AUTO: 5 10E9/L (ref 4–11)

## 2020-08-06 PROCEDURE — 80076 HEPATIC FUNCTION PANEL: CPT | Performed by: INTERNAL MEDICINE

## 2020-08-06 PROCEDURE — 80048 BASIC METABOLIC PNL TOTAL CA: CPT | Performed by: INTERNAL MEDICINE

## 2020-08-06 PROCEDURE — 82105 ALPHA-FETOPROTEIN SERUM: CPT | Performed by: INTERNAL MEDICINE

## 2020-08-06 PROCEDURE — 85610 PROTHROMBIN TIME: CPT | Performed by: INTERNAL MEDICINE

## 2020-08-06 PROCEDURE — 36415 COLL VENOUS BLD VENIPUNCTURE: CPT | Performed by: INTERNAL MEDICINE

## 2020-08-06 PROCEDURE — 85027 COMPLETE CBC AUTOMATED: CPT | Performed by: INTERNAL MEDICINE

## 2020-08-06 PROCEDURE — G0463 HOSPITAL OUTPT CLINIC VISIT: HCPCS | Mod: ZF

## 2020-08-06 RX ORDER — SENNOSIDES 8.6 MG
650 CAPSULE ORAL
COMMUNITY
Start: 2020-07-21

## 2020-08-06 RX ORDER — SPIRONOLACTONE 50 MG/1
50 TABLET, FILM COATED ORAL DAILY
Qty: 90 TABLET | Refills: 3 | Status: SHIPPED | OUTPATIENT
Start: 2020-08-06

## 2020-08-06 RX ORDER — FUROSEMIDE 20 MG
20 TABLET ORAL DAILY
COMMUNITY
Start: 2020-07-07

## 2020-08-06 RX ORDER — MAGNESIUM 200 MG
TABLET ORAL
COMMUNITY
Start: 2020-03-06

## 2020-08-06 NOTE — TELEPHONE ENCOUNTER
DATE:  8/6/2020   TIME OF RECEIPT FROM LAB:  1036  LAB TEST:  Platelet Count  LAB VALUE:  49  RESULTS GIVEN WITH READ-BACK TO (PROVIDER):  Dr. Magdy Lucia  TIME LAB VALUE REPORTED TO PROVIDER:  1038    Pt has appt today with Dr. Lucia. Results consistent with previous no action required.    Precious Yusuf LPN  Hepatology Clinic      ----- Message from Lianne Ochoa LPN sent at 8/6/2020 10:30 AM CDT -----  Regarding: FW: Critical Value    ----- Message -----  From: Tona Isaac CMA  Sent: 8/6/2020  10:24 AM CDT  To: [facility]  Subject: Critical Value                                   Please call [place] at [phone] [facility] [place]    ThanksDafne

## 2020-08-06 NOTE — NURSING NOTE
Bilateral Leg Edema, started in the past week.  Patient is very uncomfortable.    Chief Complaint   Patient presents with     RECHECK     follow up         BP (!) 166/78 (BP Location: Right arm, Patient Position: Sitting, Cuff Size: Adult Regular)   Pulse 63   Temp 98  F (36.7  C)   Wt 86.7 kg (191 lb 3.2 oz)   SpO2 99%   BMI 31.82 kg/m        Mushtaq Barney, EMT

## 2020-08-06 NOTE — PROGRESS NOTES
SUBJECTIVE:  I had the pleasure of seeing Norma Chan for followup in the Liver Clinic at the [facility] on 04/06/2020.  Ms. Chan returns for followup of alcoholic cirrhosis.      For the most part, she seems to be improving, that is certainly true from a liver standpoint.  She denies any abdominal pain, itching or skin rash.  She has mild fatigue.  She denies any increased abdominal girth, but has had a moderate amount of lower extremity edema.  She was recently started on some furosemide and she is uncertain how that has made a difference by itself.  She denies any gastrointestinal bleeding or any overt signs of hepatic encephalopathy.      She denies any fevers or chills, cough or shortness of breath.  She denies any nausea, vomiting, diarrhea or constipation.  Her appetite has been good.  Her weight has been going up and she admits to not eating particularly healthy at this time.      Her major complaint is pain in her legs.  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  She also probably does have some alcoholic peripheral neuropathy, but is on a max dose of gabapentin.  She also sounds as though she may have some cramping.     Current Outpatient Medications   Medication     acetaminophen (TYLENOL) 650 MG CR tablet     cyanocobalamin (VITAMIN B-12) 1000 MCG SUBL sublingual tablet     furosemide (LASIX) 20 MG tablet     gabapentin (NEURONTIN) 300 MG capsule     hydrOXYzine (ATARAX) 25 MG tablet     lidocaine (LIDODERM) 5 % patch     losartan (COZAAR) 25 MG tablet     magnesium oxide (MAG-OX) 400 (240 Mg) MG tablet     Prenatal Vit-Fe Fumarate-FA (PREPLUS) 27-1 MG TABS     spironolactone (ALDACTONE) 50 MG tablet     No current facility-administered medications for this visit.      BP (!) 166/78 (BP Location: Right arm, Patient Position: Sitting, Cuff Size: Adult Regular)   Pulse 63   Temp 98  F (36.7   C)   Wt 86.7 kg (191 lb 3.2 oz)   SpO2 99%   BMI 31.82 kg/m      PHYSICAL EXAMINATION:  In general, she looks relatively well.  HEENT exam shows no scleral icterus or temporal muscle wasting.  Chest is clear.  Abdominal exam shows no obvious ascites.  No masses or tenderness to palpation are present.  Her liver is 10-11 cm in span with a slightly prominent left lobe.  No spleen tip is palpable, and extremity exam shows 2+ pitting edema.  Skin exam shows a spider angioma with palmar erythema.  Neurologic exam shows no asterixis.     Recent Results (from the past 168 hour(s))   INR [[id number]]    Collection Time: 08/06/20  9:30 AM   Result Value Ref Range    INR 1.75 (H) 0.86 - 1.14   CBC with platelets [[id number]]    Collection Time: 08/06/20  9:30 AM   Result Value Ref Range    WBC 5.0 4.0 - 11.0 10e9/L    RBC Count 3.49 (L) 3.8 - 5.2 10e12/L    Hemoglobin 11.6 (L) 11.7 - 15.7 g/dL    Hematocrit 34.0 (L) 35.0 - 47.0 %    MCV 97 78 - 100 fl    MCH 33.2 (H) 26.5 - 33.0 pg    MCHC 34.1 31.5 - 36.5 g/dL    RDW 19.1 (H) 10.0 - 15.0 %    Platelet Count 49 (LL) 150 - 450 10e9/L   Basic metabolic panel [LAB15]    Collection Time: 08/06/20  9:30 AM   Result Value Ref Range    Sodium 144 133 - 144 mmol/L    Potassium 3.5 3.4 - 5.3 mmol/L    Chloride 113 (H) 94 - 109 mmol/L    Carbon Dioxide 23 20 - 32 mmol/L    Anion Gap 7 3 - 14 mmol/L    Glucose 95 70 - 99 mg/dL    Urea Nitrogen 10 7 - 30 mg/dL    Creatinine 0.62 0.52 - 1.04 mg/dL    GFR Estimate >90 >60 mL/min/[1.73_m2]    GFR Estimate If Black >90 >60 mL/min/[1.73_m2]    Calcium 8.8 8.5 - 10.1 mg/dL   Hepatic Panel [LAB20]    Collection Time: 08/06/20  9:30 AM   Result Value Ref Range    Bilirubin Direct 1.0 (H) 0.0 - 0.2 mg/dL    Bilirubin Total 2.6 (H) 0.2 - 1.3 mg/dL    Albumin 3.2 (L) 3.4 - 5.0 g/dL    Protein Total 7.2 6.8 - 8.8 g/dL    Alkaline Phosphatase 150 40 - 150 U/L    ALT 31 0 - 50 U/L    AST 41 0 - 45 U/L      She also had an ultrasound today that shows a  cirrhotic-appearing liver.  There is no ascites present.  No mass lesions in the liver.  She has an inclusion cyst along the left abdominal wall.      IMPRESSION:  My impression is that Ms. Gonzáles has alcoholic cirrhosis which seems to be relatively well compensated.  She has no ascites.  Her liver function is improved.  She has maintained sobriety, which I have complemented her on.      In terms of her leg pain, I would do believe she is on max dose of neuropathy treatment.  She is supposed to be getting another study to look at her spine disease.  I recommend that she try some tonic water at bedtime to help with the cramping.      I, otherwise, we will see her back in the clinic in 6 months with repeat imaging and blood work.      Thank you very much for allowing me to participate in the care of this patient.  If you have any questions regarding my recommendations, please do not hesitate to contact me.      Thank you very much for allowing me to participate in the care of this patient.  If you have any questions regarding my recommendations, please do not hesitate to contact me.         Magdy Lucia MD      Professor of Medicine  [employer]      Executive Medical Director, Solid Organ Transplant Program  [facility]

## 2020-08-06 NOTE — LETTER
8/6/2020         RE: Norma Gonzáles  [address]  [facility] [place] [facility] [place] [address]        Dear Colleague,    Thank you for referring your patient, Norma Gonzáles, to the [facility] HEPATOLOGY. Please see a copy of my visit note below.    SUBJECTIVE:  I had the pleasure of seeing Norma Chan for followup in the Liver Clinic at the [facility] on 04/06/2020.  Ms. Chan returns for followup of alcoholic cirrhosis.      For the most part, she seems to be improving, that is certainly true from a liver standpoint.  She denies any abdominal pain, itching or skin rash.  She has mild fatigue.  She denies any increased abdominal girth, but has had a moderate amount of lower extremity edema.  She was recently started on some furosemide and she is uncertain how that has made a difference by itself.  She denies any gastrointestinal bleeding or any overt signs of hepatic encephalopathy.      She denies any fevers or chills, cough or shortness of breath.  She denies any nausea, vomiting, diarrhea or constipation.  Her appetite has been good.  Her weight has been going up and she admits to not eating particularly healthy at this time.      Her major complaint is pain in her legs.  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  She also probably does have some alcoholic peripheral neuropathy, but is on a max dose of gabapentin.  She also sounds as though she may have some cramping.     Current Outpatient Medications   Medication     acetaminophen (TYLENOL) 650 MG CR tablet     cyanocobalamin (VITAMIN B-12) 1000 MCG SUBL sublingual tablet     furosemide (LASIX) 20 MG tablet     gabapentin (NEURONTIN) 300 MG capsule     hydrOXYzine (ATARAX) 25 MG tablet     lidocaine (LIDODERM) 5 % patch     losartan (COZAAR) 25 MG tablet     magnesium oxide (MAG-OX) 400 (240 Mg) MG tablet     Prenatal Vit-Fe Fumarate-FA (PREPLUS) 27-1 MG TABS      spironolactone (ALDACTONE) 50 MG tablet     No current facility-administered medications for this visit.      BP (!) 166/78 (BP Location: Right arm, Patient Position: Sitting, Cuff Size: Adult Regular)   Pulse 63   Temp 98  F (36.7  C)   Wt 86.7 kg (191 lb 3.2 oz)   SpO2 99%   BMI 31.82 kg/m      PHYSICAL EXAMINATION:  In general, she looks relatively well.  HEENT exam shows no scleral icterus or temporal muscle wasting.  Chest is clear.  Abdominal exam shows no obvious ascites.  No masses or tenderness to palpation are present.  Her liver is 10-11 cm in span with a slightly prominent left lobe.  No spleen tip is palpable, and extremity exam shows 2+ pitting edema.  Skin exam shows a spider angioma with palmar erythema.  Neurologic exam shows no asterixis.     Recent Results (from the past 168 hour(s))   INR [[id number]]    Collection Time: 08/06/20  9:30 AM   Result Value Ref Range    INR 1.75 (H) 0.86 - 1.14   CBC with platelets [[id number]]    Collection Time: 08/06/20  9:30 AM   Result Value Ref Range    WBC 5.0 4.0 - 11.0 10e9/L    RBC Count 3.49 (L) 3.8 - 5.2 10e12/L    Hemoglobin 11.6 (L) 11.7 - 15.7 g/dL    Hematocrit 34.0 (L) 35.0 - 47.0 %    MCV 97 78 - 100 fl    MCH 33.2 (H) 26.5 - 33.0 pg    MCHC 34.1 31.5 - 36.5 g/dL    RDW 19.1 (H) 10.0 - 15.0 %    Platelet Count 49 (LL) 150 - 450 10e9/L   Basic metabolic panel [LAB15]    Collection Time: 08/06/20  9:30 AM   Result Value Ref Range    Sodium 144 133 - 144 mmol/L    Potassium 3.5 3.4 - 5.3 mmol/L    Chloride 113 (H) 94 - 109 mmol/L    Carbon Dioxide 23 20 - 32 mmol/L    Anion Gap 7 3 - 14 mmol/L    Glucose 95 70 - 99 mg/dL    Urea Nitrogen 10 7 - 30 mg/dL    Creatinine 0.62 0.52 - 1.04 mg/dL    GFR Estimate >90 >60 mL/min/[1.73_m2]    GFR Estimate If Black >90 >60 mL/min/[1.73_m2]    Calcium 8.8 8.5 - 10.1 mg/dL   Hepatic Panel [LAB20]    Collection Time: 08/06/20  9:30 AM   Result Value Ref Range    Bilirubin Direct 1.0 (H) 0.0 - 0.2 mg/dL    Bilirubin  Total 2.6 (H) 0.2 - 1.3 mg/dL    Albumin 3.2 (L) 3.4 - 5.0 g/dL    Protein Total 7.2 6.8 - 8.8 g/dL    Alkaline Phosphatase 150 40 - 150 U/L    ALT 31 0 - 50 U/L    AST 41 0 - 45 U/L      She also had an ultrasound today that shows a cirrhotic-appearing liver.  There is no ascites present.  No mass lesions in the liver.  She has an inclusion cyst along the left abdominal wall.      IMPRESSION:  My impression is that Ms. Gonzáles has alcoholic cirrhosis which seems to be relatively well compensated.  She has no ascites.  Her liver function is improved.  She has maintained sobriety, which I have complemented her on.      In terms of her leg pain, I would do believe she is on max dose of neuropathy treatment.  She is supposed to be getting another study to look at her spine disease.  I recommend that she try some tonic water at bedtime to help with the cramping.      I, otherwise, we will see her back in the clinic in 6 months with repeat imaging and blood work.      Thank you very much for allowing me to participate in the care of this patient.  If you have any questions regarding my recommendations, please do not hesitate to contact me.      Thank you very much for allowing me to participate in the care of this patient.  If you have any questions regarding my recommendations, please do not hesitate to contact me.         Magdy Lucia MD      Professor of Medicine  [employer]      Executive Medical Director, Solid Organ Transplant Program  [facility]

## 2020-08-06 NOTE — LETTER
8/6/2020         RE: Norma Gonzáles  [address]  \[place] [place]\"" [id number]      SUBJECTIVE:  I had the pleasure of seeing Norma Chan for followup in the Liver Clinic at the [facility] on 04/06/2020.  Ms. Chan returns for followup of alcoholic cirrhosis.      For the most part, she seems to be improving, that is certainly true from a liver standpoint.  She denies any abdominal pain, itching or skin rash.  She has mild fatigue.  She denies any increased abdominal girth, but has had a moderate amount of lower extremity edema.  She was recently started on some furosemide and she is uncertain how that has made a difference by itself.  She denies any gastrointestinal bleeding or any overt signs of hepatic encephalopathy.      She denies any fevers or chills, cough or shortness of breath.  She denies any nausea, vomiting, diarrhea or constipation.  Her appetite has been good.  Her weight has been going up and she admits to not eating particularly healthy at this time.      Her major complaint is pain in her legs.  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  She also probably does have some alcoholic peripheral neuropathy, but is on a max dose of gabapentin.  She also sounds as though she may have some cramping.     Current Outpatient Medications   Medication     acetaminophen (TYLENOL) 650 MG CR tablet     cyanocobalamin (VITAMIN B-12) 1000 MCG SUBL sublingual tablet     furosemide (LASIX) 20 MG tablet     gabapentin (NEURONTIN) 300 MG capsule     hydrOXYzine (ATARAX) 25 MG tablet     lidocaine (LIDODERM) 5 % patch     losartan (COZAAR) 25 MG tablet     magnesium oxide (MAG-OX) 400 (240 Mg) MG tablet     Prenatal Vit-Fe Fumarate-FA (PREPLUS) 27-1 MG TABS     spironolactone (ALDACTONE) 50 MG tablet     No current facility-administered medications for this visit.      BP (!) 166/78 (BP Location: Right arm, Patient  Position: Sitting, Cuff Size: Adult Regular)   Pulse 63   Temp 98  F (36.7  C)   Wt 86.7 kg (191 lb 3.2 oz)   SpO2 99%   BMI 31.82 kg/m      PHYSICAL EXAMINATION:  In general, she looks relatively well.  HEENT exam shows no scleral icterus or temporal muscle wasting.  Chest is clear.  Abdominal exam shows no obvious ascites.  No masses or tenderness to palpation are present.  Her liver is 10-11 cm in span with a slightly prominent left lobe.  No spleen tip is palpable, and extremity exam shows 2+ pitting edema.  Skin exam shows a spider angioma with palmar erythema.  Neurologic exam shows no asterixis.     Recent Results (from the past 168 hour(s))   INR [[id number]]    Collection Time: 08/06/20  9:30 AM   Result Value Ref Range    INR 1.75 (H) 0.86 - 1.14   CBC with platelets [[id number]]    Collection Time: 08/06/20  9:30 AM   Result Value Ref Range    WBC 5.0 4.0 - 11.0 10e9/L    RBC Count 3.49 (L) 3.8 - 5.2 10e12/L    Hemoglobin 11.6 (L) 11.7 - 15.7 g/dL    Hematocrit 34.0 (L) 35.0 - 47.0 %    MCV 97 78 - 100 fl    MCH 33.2 (H) 26.5 - 33.0 pg    MCHC 34.1 31.5 - 36.5 g/dL    RDW 19.1 (H) 10.0 - 15.0 %    Platelet Count 49 (LL) 150 - 450 10e9/L   Basic metabolic panel [LAB15]    Collection Time: 08/06/20  9:30 AM   Result Value Ref Range    Sodium 144 133 - 144 mmol/L    Potassium 3.5 3.4 - 5.3 mmol/L    Chloride 113 (H) 94 - 109 mmol/L    Carbon Dioxide 23 20 - 32 mmol/L    Anion Gap 7 3 - 14 mmol/L    Glucose 95 70 - 99 mg/dL    Urea Nitrogen 10 7 - 30 mg/dL    Creatinine 0.62 0.52 - 1.04 mg/dL    GFR Estimate >90 >60 mL/min/[1.73_m2]    GFR Estimate If Black >90 >60 mL/min/[1.73_m2]    Calcium 8.8 8.5 - 10.1 mg/dL   Hepatic Panel [LAB20]    Collection Time: 08/06/20  9:30 AM   Result Value Ref Range    Bilirubin Direct 1.0 (H) 0.0 - 0.2 mg/dL    Bilirubin Total 2.6 (H) 0.2 - 1.3 mg/dL    Albumin 3.2 (L) 3.4 - 5.0 g/dL    Protein Total 7.2 6.8 - 8.8 g/dL    Alkaline Phosphatase 150 40 - 150 U/L    ALT 31 0 - 50  U/L    AST 41 0 - 45 U/L      She also had an ultrasound today that shows a cirrhotic-appearing liver.  There is no ascites present.  No mass lesions in the liver.  She has an inclusion cyst along the left abdominal wall.      IMPRESSION:  My impression is that Ms. Gonzáles has alcoholic cirrhosis which seems to be relatively well compensated.  She has no ascites.  Her liver function is improved.  She has maintained sobriety, which I have complemented her on.      In terms of her leg pain, I would do believe she is on max dose of neuropathy treatment.  She is supposed to be getting another study to look at her spine disease.  I recommend that she try some tonic water at bedtime to help with the cramping.      I, otherwise, we will see her back in the clinic in 6 months with repeat imaging and blood work.      Thank you very much for allowing me to participate in the care of this patient.  If you have any questions regarding my recommendations, please do not hesitate to contact me.      Thank you very much for allowing me to participate in the care of this patient.  If you have any questions regarding my recommendations, please do not hesitate to contact me.         Magdy Lucia MD      Professor of Medicine  [employer]      Executive Medical Director, Solid Organ Transplant Program  [facility]

## 2021-01-15 ENCOUNTER — HEALTH MAINTENANCE LETTER (OUTPATIENT)
Age: 56
End: 2021-01-15

## 2021-02-04 ENCOUNTER — TELEPHONE (OUTPATIENT)
Dept: GASTROENTEROLOGY | Facility: CLINIC | Age: 56
End: 2021-02-04

## 2021-02-04 NOTE — TELEPHONE ENCOUNTER
[facility] Call Center    Phone Message    May a detailed message be left on voicemail: yes     Reason for Call: Other: Pt wishes to be seen in-person for her rescheduled appt. Please contact pt back to schedule. Thanks!     Action Taken: Message routed to:  [facility]    Travel Screening: Not Applicable

## 2021-02-05 ENCOUNTER — TELEPHONE (OUTPATIENT)
Dept: GASTROENTEROLOGY | Facility: CLINIC | Age: 56
End: 2021-02-05

## 2021-02-05 NOTE — CONFIDENTIAL NOTE
Dr. Khari milan with in person visit, pt scheduled for  3/22/21.    Precious BERRY LPN  Hepatology Clinic

## 2021-04-14 ENCOUNTER — TRANSFERRED RECORDS (OUTPATIENT)
Dept: HEALTH INFORMATION MANAGEMENT | Facility: CLINIC | Age: 56
End: 2021-04-14

## 2021-05-09 ENCOUNTER — HEALTH MAINTENANCE LETTER (OUTPATIENT)
Age: 56
End: 2021-05-09

## 2021-10-13 ENCOUNTER — TRANSFERRED RECORDS (OUTPATIENT)
Dept: HEALTH INFORMATION MANAGEMENT | Facility: CLINIC | Age: 56
End: 2021-10-13

## 2021-10-24 ENCOUNTER — HEALTH MAINTENANCE LETTER (OUTPATIENT)
Age: 56
End: 2021-10-24

## 2022-06-05 ENCOUNTER — HEALTH MAINTENANCE LETTER (OUTPATIENT)
Age: 57
End: 2022-06-05

## 2022-10-16 ENCOUNTER — HEALTH MAINTENANCE LETTER (OUTPATIENT)
Age: 57
End: 2022-10-16

## 2023-04-04 ENCOUNTER — TELEPHONE (OUTPATIENT)
Dept: GASTROENTEROLOGY | Facility: CLINIC | Age: 58
End: 2023-04-04

## 2023-04-04 NOTE — TELEPHONE ENCOUNTER
[facility] Call Center    Phone Message    May a detailed message be left on voicemail: yes     Reason for Call: Other: Patient's brother, Zafar, called.  Patient has Hep C now, as per Dr. Marmolejo.  Dr. Marmolejo would like to discuss this with Dr. Lucia.  Patient said that Dr. Lucia and Dr. Marmolejo know each other and would like Dr. Marmolejo would like Dr. Lucia to give Dr. Marmolejo a call to discuss treatment.       Action Taken: Message routed to:  [facility]    Travel Screening: Not Applicable

## 2023-06-01 ENCOUNTER — HEALTH MAINTENANCE LETTER (OUTPATIENT)
Age: 58
End: 2023-06-01

## 2023-06-17 ENCOUNTER — HEALTH MAINTENANCE LETTER (OUTPATIENT)
Age: 58
End: 2023-06-17